# Patient Record
(demographics unavailable — no encounter records)

---

## 2025-01-14 NOTE — HISTORY OF PRESENT ILLNESS
[FreeTextEntry1] : no issues S/P myomectomy  Recent treatment for a UTI  [Patient reported mammogram was normal] : Patient reported mammogram was normal [Patient reported PAP Smear was normal] : Patient reported PAP Smear was normal [Mammogramdate] : 2024 [PapSmeardate] : 2022